# Patient Record
Sex: FEMALE | Race: WHITE | NOT HISPANIC OR LATINO | URBAN - METROPOLITAN AREA
[De-identification: names, ages, dates, MRNs, and addresses within clinical notes are randomized per-mention and may not be internally consistent; named-entity substitution may affect disease eponyms.]

---

## 2020-07-22 ENCOUNTER — OFFICE VISIT (OUTPATIENT)
Dept: URBAN - METROPOLITAN AREA CLINIC 78 | Facility: CLINIC | Age: 45
End: 2020-07-22
Payer: MEDICARE

## 2020-07-22 DIAGNOSIS — K76.0 FATTY INFILTRATION OF LIVER: ICD-10-CM

## 2020-07-22 DIAGNOSIS — R19.8 CHANGE IN BOWEL FUNCTION: ICD-10-CM

## 2020-07-22 DIAGNOSIS — R10.11 RIGHT UPPER QUADRANT ABDOMINAL PAIN: ICD-10-CM

## 2020-07-22 DIAGNOSIS — R14.1 BLAOTING AND GAS PAIN: ICD-10-CM

## 2020-07-22 PROBLEM — 408512008 BODY MASS INDEX 40+ - SEVERELY OBESE: Status: ACTIVE | Noted: 2020-07-22

## 2020-07-22 PROCEDURE — 99244 OFF/OP CNSLTJ NEW/EST MOD 40: CPT | Performed by: INTERNAL MEDICINE

## 2020-07-22 PROCEDURE — 99204 OFFICE O/P NEW MOD 45 MIN: CPT | Performed by: INTERNAL MEDICINE

## 2020-07-22 RX ORDER — FLUOXETINE HYDROCHLORIDE 40 MG/1
1 CAPSULE CAPSULE ORAL ONCE A DAY
Status: ACTIVE | COMMUNITY

## 2020-07-22 RX ORDER — SODIUM, POTASSIUM,MAG SULFATES 17.5-3.13G
177 ML DAY 1 AND 177 ML DAY 2 SOLUTION, RECONSTITUTED, ORAL ORAL
Qty: 354 MILLILITER | Refills: 0 | OUTPATIENT
Start: 2020-07-22

## 2020-07-22 NOTE — PHYSICAL EXAM GASTROINTESTINAL
Abdomen , soft, TTP in epigastric area non-distended , no guarding or rigidity , no masses palpable , normal bowel soundsLiver and Spleen , OSM limited sec to central obesity

## 2020-07-22 NOTE — PHYSICAL EXAM HENT:
Head, normocephalic, atraumatic, Face, Face within normal limits, Ears, External ears within normal limits, Nose/Nasopharynx, External nose normal appearance

## 2020-07-22 NOTE — HPI-TODAY'S VISIT:
Patient complains of pain in the RUQ  x 6 months  Dull annoyance worse after food within 10 mts she has to use restroom  Post prandial her stomach 'blows up " Excessive flatulence  Bloating   Baseline BM : 2 x day  Now 5-6 BM/day mushy stools  Has a sense of incomplete evacuation  It is not associated with nausea / emesis It is not associated with Hematemesis / Melena / Hematochezia / BRBPR    Labs 6/30/20  CMP normal    US 6/15/20 Normal GB  Fatty liver    GM ( M ) crohns disease at age 56  Great aunt also has IBD Mother has IBS  Denies NSAID use  Denies Magnesium use

## 2020-07-23 ENCOUNTER — WEB ENCOUNTER (OUTPATIENT)
Dept: URBAN - METROPOLITAN AREA CLINIC 78 | Facility: CLINIC | Age: 45
End: 2020-07-23

## 2020-08-03 ENCOUNTER — WEB ENCOUNTER (OUTPATIENT)
Dept: URBAN - METROPOLITAN AREA CLINIC 78 | Facility: CLINIC | Age: 45
End: 2020-08-03

## 2020-08-07 ENCOUNTER — WEB ENCOUNTER (OUTPATIENT)
Dept: URBAN - METROPOLITAN AREA CLINIC 78 | Facility: CLINIC | Age: 45
End: 2020-08-07

## 2020-08-14 ENCOUNTER — WEB ENCOUNTER (OUTPATIENT)
Dept: URBAN - METROPOLITAN AREA CLINIC 78 | Facility: CLINIC | Age: 45
End: 2020-08-14

## 2020-08-18 ENCOUNTER — CLAIMS CREATED FROM THE CLAIM WINDOW (OUTPATIENT)
Dept: URBAN - METROPOLITAN AREA CLINIC 4 | Facility: CLINIC | Age: 45
End: 2020-08-18
Payer: MEDICARE

## 2020-08-18 ENCOUNTER — OFFICE VISIT (OUTPATIENT)
Dept: URBAN - METROPOLITAN AREA SURGERY CENTER 15 | Facility: SURGERY CENTER | Age: 45
End: 2020-08-18
Payer: MEDICARE

## 2020-08-18 DIAGNOSIS — R10.11 ABDOMINAL BURNING SENSATION IN RIGHT UPPER QUADRANT: ICD-10-CM

## 2020-08-18 DIAGNOSIS — K31.89 OTHER DISEASES OF STOMACH AND DUODENUM: ICD-10-CM

## 2020-08-18 DIAGNOSIS — R19.4 ALTERED BOWEL HABITS: ICD-10-CM

## 2020-08-18 DIAGNOSIS — K21.0 GASTRO-ESOPHAGEAL REFLUX DISEASE WITH ESOPHAGITIS: ICD-10-CM

## 2020-08-18 DIAGNOSIS — K21.9 ACID REFLUX: ICD-10-CM

## 2020-08-18 PROCEDURE — G8907 PT DOC NO EVENTS ON DISCHARG: HCPCS | Performed by: INTERNAL MEDICINE

## 2020-08-18 PROCEDURE — 43239 EGD BIOPSY SINGLE/MULTIPLE: CPT | Performed by: INTERNAL MEDICINE

## 2020-08-18 PROCEDURE — G9937 DIG OR SURV COLSCO: HCPCS | Performed by: INTERNAL MEDICINE

## 2020-08-18 PROCEDURE — 45378 DIAGNOSTIC COLONOSCOPY: CPT | Performed by: INTERNAL MEDICINE

## 2020-08-18 PROCEDURE — 88305 TISSUE EXAM BY PATHOLOGIST: CPT | Performed by: PATHOLOGY

## 2020-08-18 PROCEDURE — 88342 IMHCHEM/IMCYTCHM 1ST ANTB: CPT | Performed by: PATHOLOGY

## 2020-08-18 PROCEDURE — 88312 SPECIAL STAINS GROUP 1: CPT | Performed by: PATHOLOGY

## 2020-08-21 ENCOUNTER — WEB ENCOUNTER (OUTPATIENT)
Dept: URBAN - METROPOLITAN AREA CLINIC 78 | Facility: CLINIC | Age: 45
End: 2020-08-21

## 2020-09-02 ENCOUNTER — WEB ENCOUNTER (OUTPATIENT)
Dept: URBAN - METROPOLITAN AREA CLINIC 78 | Facility: CLINIC | Age: 45
End: 2020-09-02

## 2020-09-17 ENCOUNTER — DASHBOARD ENCOUNTERS (OUTPATIENT)
Age: 45
End: 2020-09-17

## 2020-09-17 ENCOUNTER — OFFICE VISIT (OUTPATIENT)
Dept: URBAN - METROPOLITAN AREA CLINIC 78 | Facility: CLINIC | Age: 45
End: 2020-09-17
Payer: MEDICARE

## 2020-09-17 DIAGNOSIS — K58.9 IBS (IRRITABLE BOWEL SYNDROME): ICD-10-CM

## 2020-09-17 DIAGNOSIS — K21.9 GERD: ICD-10-CM

## 2020-09-17 DIAGNOSIS — K76.0 FATTY INFILTRATION OF LIVER: ICD-10-CM

## 2020-09-17 DIAGNOSIS — R14.1 BLAOTING AND GAS PAIN: ICD-10-CM

## 2020-09-17 PROBLEM — 408512008 BODY MASS INDEX 40+ - SEVERELY OBESE: Status: ACTIVE | Noted: 2020-09-17

## 2020-09-17 PROCEDURE — 99214 OFFICE O/P EST MOD 30 MIN: CPT | Performed by: INTERNAL MEDICINE

## 2020-09-17 RX ORDER — SODIUM, POTASSIUM,MAG SULFATES 17.5-3.13G
177 ML DAY 1 AND 177 ML DAY 2 SOLUTION, RECONSTITUTED, ORAL ORAL
Qty: 354 MILLILITER | Refills: 0 | Status: ACTIVE | COMMUNITY
Start: 2020-07-22

## 2020-09-17 RX ORDER — FLUOXETINE HYDROCHLORIDE 40 MG/1
1 CAPSULE CAPSULE ORAL ONCE A DAY
Status: ACTIVE | COMMUNITY

## 2020-09-17 RX ORDER — DICYCLOMINE HYDROCHLORIDE 10 MG/1
1 CAPSULE CAPSULE ORAL THREE TIMES A DAY
Qty: 90 CAPSULE | Refills: 0 | OUTPATIENT
Start: 2020-09-17

## 2020-09-17 NOTE — HPI-TODAY'S VISIT:
Patient is here in a follow up after the recent EGD and a colonoscopy. The findings of the procedure and the pathology were discussed with the patient. All questions were answered to the patient's satisfaction.''  Patient reports extremme bloating and freq BM  Feels like BM are not complete  BM consistency is pretty normal ( consistency has normalized ) RUQ pain has resolved     Previous visit: Patient complains of pain in the RUQ  x 6 months  Dull annoyance worse after food within 10 mts she has to use restroom  Post prandial her stomach 'blows up " Excessive flatulence  Bloating   Baseline BM : 2 x day  Now 5-6 BM/day mushy stools  Has a sense of incomplete evacuation  It is not associated with nausea / emesis It is not associated with Hematemesis / Melena / Hematochezia / BRBPR    Labs 6/30/20  CMP normal    US 6/15/20 Normal GB  Fatty liver    GM ( M ) crohns disease at age 56  Great aunt also has IBD Mother has IBS  Denies NSAID use  Denies Magnesium use

## 2020-09-21 ENCOUNTER — OFFICE VISIT (OUTPATIENT)
Dept: URBAN - METROPOLITAN AREA TELEHEALTH 2 | Facility: TELEHEALTH | Age: 45
End: 2020-09-21
Payer: MEDICARE

## 2020-09-21 DIAGNOSIS — K76.0 FATTY (CHANGE OF) LIVER, NOT ELSEWHERE CLASSIFIED: ICD-10-CM

## 2020-09-21 PROCEDURE — 97802 MEDICAL NUTRITION INDIV IN: CPT | Performed by: DIETITIAN, REGISTERED

## 2020-09-21 RX ORDER — SODIUM, POTASSIUM,MAG SULFATES 17.5-3.13G
177 ML DAY 1 AND 177 ML DAY 2 SOLUTION, RECONSTITUTED, ORAL ORAL
Qty: 354 MILLILITER | Refills: 0 | Status: ACTIVE | COMMUNITY
Start: 2020-07-22

## 2020-09-21 RX ORDER — FLUOXETINE HYDROCHLORIDE 40 MG/1
1 CAPSULE CAPSULE ORAL ONCE A DAY
Status: ACTIVE | COMMUNITY

## 2020-09-21 RX ORDER — DICYCLOMINE HYDROCHLORIDE 10 MG/1
1 CAPSULE CAPSULE ORAL THREE TIMES A DAY
Qty: 90 CAPSULE | Refills: 0 | Status: ACTIVE | COMMUNITY
Start: 2020-09-17

## 2025-06-10 ENCOUNTER — OFFICE VISIT (OUTPATIENT)
Dept: URBAN - METROPOLITAN AREA CLINIC 78 | Facility: CLINIC | Age: 50
End: 2025-06-10
Payer: COMMERCIAL

## 2025-06-10 DIAGNOSIS — K31.A0 GASTRIC INTESTINAL METAPLASIA: ICD-10-CM

## 2025-06-10 DIAGNOSIS — Z12.11 SCREENING FOR COLON CANCER: ICD-10-CM

## 2025-06-10 DIAGNOSIS — J02.9 SORE THROAT: ICD-10-CM

## 2025-06-10 PROBLEM — 72519002: Status: ACTIVE | Noted: 2025-06-10

## 2025-06-10 PROBLEM — 305058001: Status: ACTIVE | Noted: 2025-06-10

## 2025-06-10 PROCEDURE — 99203 OFFICE O/P NEW LOW 30 MIN: CPT

## 2025-06-10 RX ORDER — OMEPRAZOLE 20 MG/1
CAPSULE, DELAYED RELEASE ORAL
Qty: 180 CAPSULE | Status: ACTIVE | COMMUNITY

## 2025-06-10 RX ORDER — METFORMIN HYDROCHLORIDE 500 MG/1
TAKE 1 TABLET BY MOUTH EVERY DAY WITH A MEAL FOR 30 DAYS TABLET, FILM COATED ORAL
Qty: 30 EACH | Refills: 1 | Status: ACTIVE | COMMUNITY

## 2025-06-10 RX ORDER — FLUOXETINE HYDROCHLORIDE 40 MG/1
1 CAPSULE CAPSULE ORAL ONCE A DAY
Qty: 30 | Status: ACTIVE | COMMUNITY
Start: 2025-06-10

## 2025-06-10 RX ORDER — PROGESTERONE 200 MG/1
1 CAPSULE AT BEDTIME CAPSULE ORAL ONCE A DAY
Qty: 30 | Status: ACTIVE | COMMUNITY
Start: 2025-06-10

## 2025-06-10 RX ORDER — PRASTERONE (DHEA)/CAL PHOS 25-43MG
AS DIRECTED TABLET ORAL
Status: ACTIVE | COMMUNITY
Start: 2025-06-10

## 2025-06-10 NOTE — HPI-TODAY'S VISIT:
49-year-old female, new patient, presents for EGD consult.  She has a bariatric surgery ( gastric sleeve) in Aug 2023. She began to have stomach issues since  May 2024. She had an EGD (as below) She is on omperazole 20mg BID since then.  She has been having a a very sore throat for the past few weeks. This initially was intermittent, but has become a it more persistent She does drink an inc amount of water and takes allergy medication daily. She notes that if she talks for too long, she gets raspy/hoarse throat.  She deny F/C/N/V/GERD/dysphagia/abn weight loss/ abdominal pain/change in BH.  She have a BM QD or QOD, no blood or mucus seen in the stool.  She does have hyperglycemia, following with endocrinologist, on metformin  CP/SOB: none Recent Cardiology or Pulmonology Eval: none Use of BT/NSAID/ GLP1 use: none  ~~ Prior Workup~~  6/7/2024 EGD w/ Dr. Orion Ferrera: PATH: Squama glandular junctional mucosa with FIM consistent with Raines's esophagus in the distal esophagus.  Focally up to 7 intraepithelial eosinophils in the distal esophagus.  Negative for dysplasia.  Mild chronic gastritis, involving gastric antral mucosa in the stomach antrum, negative for intestinal metaplasia or H. pylori.  No abnormality in the duodenum.  7/2020 RUQ US: Fatty liver.  8/2020 colonoscopy with [facility]: IH, otherwise normal exam, no specimens collected.  8/2020 EGD with [facility]: Z-line variable, 36 cm from incisors, hyper tonic lower esophageal sphincter, erythematous mucosa in the stomach, PATH: Squamocolumnar mucosa with reflux-type changes at 36 cm of the GEJ, no abnormality in the stomach or duodenum.

## 2025-06-13 ENCOUNTER — TELEPHONE ENCOUNTER (OUTPATIENT)
Dept: URBAN - METROPOLITAN AREA CLINIC 78 | Facility: CLINIC | Age: 50
End: 2025-06-13

## 2025-07-03 ENCOUNTER — CLAIMS CREATED FROM THE CLAIM WINDOW (OUTPATIENT)
Dept: URBAN - METROPOLITAN AREA SURGERY CENTER 15 | Facility: SURGERY CENTER | Age: 50
End: 2025-07-03
Payer: COMMERCIAL

## 2025-07-03 ENCOUNTER — CLAIMS CREATED FROM THE CLAIM WINDOW (OUTPATIENT)
Dept: URBAN - METROPOLITAN AREA CLINIC 4 | Facility: CLINIC | Age: 50
End: 2025-07-03
Payer: COMMERCIAL

## 2025-07-03 DIAGNOSIS — K22.89 OTHER SPECIFIED DISEASE OF ESOPHAGUS: ICD-10-CM

## 2025-07-03 DIAGNOSIS — F41.9 ANXIETY DISORDER, UNSPECIFIED: ICD-10-CM

## 2025-07-03 DIAGNOSIS — K21.9 GASTRO-ESOPHAGEAL REFLUX DISEASE WITHOUT ESOPHAGITIS: ICD-10-CM

## 2025-07-03 DIAGNOSIS — Z90.3 STATUS POST SLEEVE GASTRECTOMY: ICD-10-CM

## 2025-07-03 DIAGNOSIS — K31.89 REACTIVE GASTROPATHY: ICD-10-CM

## 2025-07-03 DIAGNOSIS — K29.70 GASTRITIS, UNSPECIFIED, WITHOUT BLEEDING: ICD-10-CM

## 2025-07-03 PROCEDURE — 43239 EGD BIOPSY SINGLE/MULTIPLE: CPT | Performed by: INTERNAL MEDICINE

## 2025-07-03 PROCEDURE — 88312 SPECIAL STAINS GROUP 1: CPT | Performed by: PATHOLOGY

## 2025-07-03 PROCEDURE — 88305 TISSUE EXAM BY PATHOLOGIST: CPT | Performed by: PATHOLOGY

## 2025-07-03 PROCEDURE — 00731 ANES UPR GI NDSC PX NOS: CPT | Performed by: REGISTERED NURSE

## 2025-07-03 RX ORDER — PRASTERONE (DHEA)/CAL PHOS 25-43MG
AS DIRECTED TABLET ORAL
Status: ACTIVE | COMMUNITY
Start: 2025-06-10

## 2025-07-03 RX ORDER — PROGESTERONE 200 MG/1
1 CAPSULE AT BEDTIME CAPSULE ORAL ONCE A DAY
Qty: 30 | Status: ACTIVE | COMMUNITY
Start: 2025-06-10

## 2025-07-03 RX ORDER — METFORMIN HYDROCHLORIDE 500 MG/1
TAKE 1 TABLET BY MOUTH EVERY DAY WITH A MEAL FOR 30 DAYS TABLET, FILM COATED ORAL
Qty: 30 EACH | Refills: 1 | Status: ACTIVE | COMMUNITY

## 2025-07-03 RX ORDER — OMEPRAZOLE 20 MG/1
CAPSULE, DELAYED RELEASE ORAL
Qty: 180 CAPSULE | Status: ACTIVE | COMMUNITY

## 2025-07-03 RX ORDER — FLUOXETINE HYDROCHLORIDE 40 MG/1
1 CAPSULE CAPSULE ORAL ONCE A DAY
Qty: 30 | Status: ACTIVE | COMMUNITY
Start: 2025-06-10

## 2025-07-30 ENCOUNTER — WEB ENCOUNTER (OUTPATIENT)
Dept: URBAN - METROPOLITAN AREA CLINIC 78 | Facility: CLINIC | Age: 50
End: 2025-07-30